# Patient Record
Sex: FEMALE | Race: WHITE | NOT HISPANIC OR LATINO | Employment: OTHER | URBAN - NONMETROPOLITAN AREA
[De-identification: names, ages, dates, MRNs, and addresses within clinical notes are randomized per-mention and may not be internally consistent; named-entity substitution may affect disease eponyms.]

---

## 2017-02-10 ENCOUNTER — OFFICE VISIT (OUTPATIENT)
Dept: CARDIOLOGY | Facility: CLINIC | Age: 67
End: 2017-02-10

## 2017-02-10 VITALS
HEIGHT: 66 IN | SYSTOLIC BLOOD PRESSURE: 148 MMHG | OXYGEN SATURATION: 98 % | WEIGHT: 227 LBS | DIASTOLIC BLOOD PRESSURE: 74 MMHG | HEART RATE: 78 BPM | BODY MASS INDEX: 36.48 KG/M2

## 2017-02-10 DIAGNOSIS — I10 ESSENTIAL HYPERTENSION: ICD-10-CM

## 2017-02-10 DIAGNOSIS — E66.09 OBESITY DUE TO EXCESS CALORIES, UNSPECIFIED OBESITY SEVERITY: ICD-10-CM

## 2017-02-10 DIAGNOSIS — I34.0 NON-RHEUMATIC MITRAL REGURGITATION: Chronic | ICD-10-CM

## 2017-02-10 DIAGNOSIS — E78.5 HYPERLIPIDEMIA, UNSPECIFIED HYPERLIPIDEMIA TYPE: ICD-10-CM

## 2017-02-10 DIAGNOSIS — R06.02 SOB (SHORTNESS OF BREATH): ICD-10-CM

## 2017-02-10 DIAGNOSIS — G47.30 SLEEP APNEA, UNSPECIFIED TYPE: ICD-10-CM

## 2017-02-10 DIAGNOSIS — I51.89 DIASTOLIC DYSFUNCTION: ICD-10-CM

## 2017-02-10 DIAGNOSIS — R07.9 CHEST PAIN ON EXERTION: Primary | ICD-10-CM

## 2017-02-10 PROCEDURE — 99204 OFFICE O/P NEW MOD 45 MIN: CPT | Performed by: NURSE PRACTITIONER

## 2017-02-10 PROCEDURE — 93000 ELECTROCARDIOGRAM COMPLETE: CPT | Performed by: NURSE PRACTITIONER

## 2017-02-10 RX ORDER — CALCIUM CARBONATE 750 MG/1
750 TABLET, CHEWABLE ORAL DAILY
COMMUNITY
End: 2017-02-10

## 2017-02-10 RX ORDER — DIAPER,BRIEF,INFANT-TODD,DISP
EACH MISCELLANEOUS
Refills: 0 | COMMUNITY
Start: 2017-02-06

## 2017-02-10 RX ORDER — VALSARTAN AND HYDROCHLOROTHIAZIDE 80; 12.5 MG/1; MG/1
1 TABLET, FILM COATED ORAL DAILY
Refills: 3 | COMMUNITY
Start: 2017-02-06

## 2017-02-10 NOTE — PROGRESS NOTES
Subjective:     Encounter Date:02/10/2017    Chief Complaint:    Patient ID: Ledy Blankenship is a 66 y.o. female here today for cardiac evaluation. She is referred by JOSE Oates.     HPI     Heart Murmur    Additional comments: unknown, had an echocardiogram in May 2016           Chest Pain    Additional comments: had stress tests at age 50, 60, 65 (May 2016)       Last edited by JOSE Velasquez on 2/10/2017 10:16 AM. (History)       Heart Murmur   This is a new problem. The current episode started 1 to 4 weeks ago. The problem occurs daily. The problem has been unchanged. Associated symptoms include chest pain (tightness) and congestion (improving). Pertinent negatives include no abdominal pain, chills, fever, headaches, nausea, neck pain, numbness, rash or vomiting. Cough: still coughing, now clear. Nothing aggravates the symptoms. She has tried nothing for the symptoms.   Chest Pain    This is a new problem. The current episode started more than 1 year ago. The onset quality is sudden. The problem occurs intermittently. The problem has been unchanged. The pain is present in the substernal region. The pain is at a severity of 7/10. The pain is moderate. The quality of the pain is described as dull and heavy. The pain does not radiate. Associated symptoms include irregular heartbeat, palpitations and shortness of breath. Pertinent negatives include no abdominal pain, back pain, dizziness, fever, headaches, nausea, numbness, syncope or vomiting. Cough: still coughing, now clear. The pain is aggravated by exertion. She has tried rest for the symptoms. The treatment provided significant (less than a minute) relief. Risk factors include lack of exercise and post-menopausal.   Her past medical history is significant for hyperlipidemia, hypertension, sleep apnea and valve disorder.   Her family medical history is significant for CAD and hypertension. Prior diagnostic workup includes echocardiogram and  stress echo.     History:   Past Medical History   Diagnosis Date   • Diastolic dysfunction    • Heart murmur    • Hyperlipidemia      has taken Crestor in the past a few times per week   • Hypertension    • Obesity    • Sleep apnea      unable to tolerate CPAP, better after tonsils removed   • SOB (shortness of breath)    • Valvular disease      mild MR     Past Surgical History   Procedure Laterality Date   • Tonsillectomy     • Hysterectomy     • Joint replacement     • Rotator cuff repair     • Cardiac catheterization       Social History     Social History   • Marital status: Unknown     Spouse name: N/A   • Number of children: N/A   • Years of education: N/A     Occupational History   • Not on file.     Social History Main Topics   • Smoking status: Former Smoker     Packs/day: 2.00     Years: 14.00     Types: Cigarettes     Start date: 1966     Quit date: 11/1980   • Smokeless tobacco: Never Used   • Alcohol use Yes      Comment: rarely, one drink once a month or less, drank a little more when she was younger   • Drug use: No   • Sexual activity: Not on file     Other Topics Concern   • Not on file     Social History Narrative     Family History   Problem Relation Age of Onset   • Heart failure Mother    • Valvular heart disease Mother    • Rheumatic fever Mother    • Aneurysm Father    • Heart failure Sister    • Heart disease Sister    • Valvular heart disease Sister    • Heart attack Sister    • Heart disease Brother    • Diabetes Brother    • Kidney failure Brother    • Heart disease Sister    • Valvular heart disease Sister    • Hypertension Sister        Outpatient Prescriptions Marked as Taking for the 2/10/17 encounter (Office Visit) with JOSE Velasquez   Medication Sig Dispense Refill   • [facility] CHEST CONGESTION RELIEF 400 MG tablet TAKE 1 TABLET BY MOUTH 3 TIMES A DAY PRN  0   • fluticasone (FLONASE) 50 MCG/ACT nasal spray 1 spray into each nostril Daily.     • NON FORMULARY Take 700 mg by  "mouth Daily.     • valsartan-hydrochlorothiazide (DIOVAN-HCT) 80-12.5 MG per tablet Take 1 tablet by mouth Daily.  3   • vitamin B-12 (CYANOCOBALAMIN) 1000 MCG tablet Take 1,000 mcg by mouth Daily.     • [DISCONTINUED] calcium carbonate EX (TUMS EX) 750 MG chewable tablet Chew 750 mg Daily.         Review of Systems:  Review of Systems   Constitution: Negative for chills, decreased appetite and fever.   HENT: Positive for congestion (improving). Negative for headaches.    Eyes: Negative for blurred vision and double vision.   Cardiovascular: Positive for chest pain (tightness), irregular heartbeat and palpitations. Negative for leg swelling and syncope.   Respiratory: Positive for shortness of breath. Negative for wheezing. Cough: still coughing, now clear.    Endocrine: Positive for cold intolerance. Negative for heat intolerance.   Hematologic/Lymphatic: Does not bruise/bleed easily.   Skin: Positive for dry skin. Negative for rash.   Musculoskeletal: Positive for muscle cramps (sometimes ). Negative for back pain, joint pain, neck pain and stiffness.   Gastrointestinal: Negative for abdominal pain, constipation, diarrhea, heartburn, melena, nausea and vomiting.   Genitourinary: Positive for nocturia (2 times a night ). Negative for hematuria.   Neurological: Negative for dizziness, light-headedness, loss of balance and numbness.   Psychiatric/Behavioral: Negative for depression. The patient does not have insomnia and is not nervous/anxious.             Objective:     Visit Vitals   • /74 (BP Location: Right arm, Patient Position: Sitting, Cuff Size: Adult)   • Pulse 78   • Ht 66\" (167.6 cm)   • Wt 227 lb (103 kg)   • SpO2 98%   • BMI 36.64 kg/m2         Physical Exam   Constitutional: She is oriented to person, place, and time. She appears well-developed and well-nourished.   HENT:   Head: Normocephalic and atraumatic.   Right Ear: External ear normal.   Left Ear: External ear normal.   Nose: Nose normal. "   Mouth/Throat: Oropharynx is clear and moist.   Eyes: Conjunctivae and EOM are normal. Pupils are equal, round, and reactive to light. Right eye exhibits no discharge. Left eye exhibits no discharge. No scleral icterus.   Neck: Normal range of motion. Neck supple. Normal carotid pulses, no hepatojugular reflux and no JVD present. Carotid bruit is not present. No tracheal deviation present. No thyromegaly present.   Thick     Cardiovascular: Normal rate and regular rhythm.   Occasional extrasystoles are present. Exam reveals no gallop and no friction rub.    No murmur heard.  Pulmonary/Chest: Effort normal and breath sounds normal. She has no wheezes. She has no rales.   Abdominal: Soft. Bowel sounds are normal. She exhibits no mass. There is no tenderness. There is no rebound and no guarding.   obese   Musculoskeletal: She exhibits no edema, tenderness or deformity.   Lymphadenopathy:     She has no cervical adenopathy.   Neurological: She is alert and oriented to person, place, and time. No cranial nerve deficit.   Skin: Skin is warm and dry.   Psychiatric: She has a normal mood and affect. Her behavior is normal. Judgment and thought content normal.   Vitals reviewed.      Lab/Diagnostics Review:   06/09/2016   CBC WBC 9.4, hemoglobin 14, hematocrit 41.3%, platelets 243,000  BMP sodium 139, potassium 3.5, chloride 10 CO2 27.3,UN 16, creatinine 0.82, calcium 9.6, glucose 109    05/12/2016 stress echocardiogram low risk of ischemia EF 50%, decreased functional    05/12/2016 2-D echocardiogram EF 65%, no regional wall motion abnormalities, grade 2 diastolic dysfunction,mild mitral regurgitation, moderate left atrial enlargement, mild right ventricular enlargement with normal systolic function, mild tricuspid regurgitation, pulmonary systolic pressure within normal range estimated at 24 mmHg.      ECG 12 Lead  Date/Time: 2/10/2017 6:02 PM  Performed by: TAWANDA MORA  Authorized by: TAWANDA MORA    Previous ECG: no previous ECG available  Rhythm: sinus rhythm  Rate: normal  BPM: 75  T Waves: T waves normal  QRS axis: normal  Other: no other findings  Clinical impression: normal ECG                  Assessment/Plan:         Ledy was seen today for heart murmur and chest pain.    Diagnoses and all orders for this visit:    Chest pain on exertion  Comments:  will check stress sestimibe  Orders:  -     Stress Test With Myocardial Perfusion One Day  -     Adult Transthoracic Echo Complete  -     CBC Auto Differential  -     Vitamin D 1,25 Dihydroxy    SOB (shortness of breath)  Comments:  possibly multifactorial - check stress sestamibe and echo, if normal will encourage routine aerobic exercise  Orders:  -     Adult Transthoracic Echo Complete    Diastolic dysfunction  Comments:  grade 2 per 5/2016 echo, will recheck     Essential hypertension  Comments:  might consider adding low dose beta-blocker or amlodipine - might help chest pain if no major areas of ischemia on stress    Non-rheumatic mitral regurgitation  Comments:  mild per 2016 echo  Orders:  -     Adult Transthoracic Echo Complete    Hyperlipidemia, unspecified hyperlipidemia type  Comments:  reportedly borderline, not checked in 2016, will check, restart statin if uncontrolled  Orders:  -     Comprehensive Metabolic Panel  -     Lipid Panel  -     TSH    Sleep apnea, unspecified type  Comments:  unable to tolerate CPAP, better after tonsillectomy    Obesity due to excess calories, unspecified obesity severity  Comments:  encourage healthy diet and exercise for weight loss after cardiac evaluation    Other orders  -     ECG 12 Lead        Return in about 6 months (around 8/10/2017) for Recheck.           Jade Garber APRN, ACNP-BC, CHFN-BC

## 2017-03-01 ENCOUNTER — HOSPITAL ENCOUNTER (OUTPATIENT)
Dept: CARDIOLOGY | Facility: HOSPITAL | Age: 67
Discharge: HOME | End: 2017-03-01

## 2017-03-01 ENCOUNTER — HOSPITAL ENCOUNTER (OUTPATIENT)
Dept: CARDIOLOGY | Facility: HOSPITAL | Age: 67
Discharge: HOME | End: 2017-03-01
Admitting: NURSE PRACTITIONER

## 2017-03-01 VITALS — HEART RATE: 67 BPM | SYSTOLIC BLOOD PRESSURE: 123 MMHG | DIASTOLIC BLOOD PRESSURE: 60 MMHG

## 2017-03-01 VITALS
HEIGHT: 66 IN | BODY MASS INDEX: 36.48 KG/M2 | SYSTOLIC BLOOD PRESSURE: 147 MMHG | WEIGHT: 227 LBS | DIASTOLIC BLOOD PRESSURE: 72 MMHG

## 2017-03-01 DIAGNOSIS — E78.5 HYPERLIPIDEMIA, UNSPECIFIED HYPERLIPIDEMIA TYPE: ICD-10-CM

## 2017-03-01 LAB
25(OH)D3 SERPL-MCNC: 27.2 NG/ML (ref 30–100)
ALBUMIN SERPL-MCNC: 3.9 G/DL (ref 3.5–5)
ALBUMIN/GLOB SERPL: 1.1 G/DL (ref 1.1–2.5)
ALP SERPL-CCNC: 62 U/L (ref 24–120)
ALT SERPL W P-5'-P-CCNC: 60 U/L (ref 0–54)
ANION GAP SERPL CALCULATED.3IONS-SCNC: 8 MMOL/L (ref 4–13)
ARTICHOKE IGE QN: 131 MG/DL (ref 0–99)
AST SERPL-CCNC: 52 U/L (ref 7–45)
BH CV ECHO MEAS - AO MAX PG (FULL): 0.64 MMHG
BH CV ECHO MEAS - AO MAX PG: 10.5 MMHG
BH CV ECHO MEAS - AO MEAN PG (FULL): 0 MMHG
BH CV ECHO MEAS - AO MEAN PG: 6 MMHG
BH CV ECHO MEAS - AO ROOT AREA (BSA CORRECTED): 1.2
BH CV ECHO MEAS - AO ROOT AREA: 5.3 CM^2
BH CV ECHO MEAS - AO ROOT DIAM: 2.6 CM
BH CV ECHO MEAS - AO V2 MAX: 162 CM/SEC
BH CV ECHO MEAS - AO V2 MEAN: 113 CM/SEC
BH CV ECHO MEAS - AO V2 VTI: 40.7 CM
BH CV ECHO MEAS - AVA(I,A): 3 CM^2
BH CV ECHO MEAS - AVA(I,D): 3 CM^2
BH CV ECHO MEAS - AVA(V,A): 3 CM^2
BH CV ECHO MEAS - AVA(V,D): 3 CM^2
BH CV ECHO MEAS - BSA(HAYCOCK): 2.2 M^2
BH CV ECHO MEAS - BSA: 2.1 M^2
BH CV ECHO MEAS - BZI_BMI: 36.2 KILOGRAMS/M^2
BH CV ECHO MEAS - BZI_METRIC_HEIGHT: 167.6 CM
BH CV ECHO MEAS - BZI_METRIC_WEIGHT: 101.6 KG
BH CV ECHO MEAS - EDV(CUBED): 85.2 ML
BH CV ECHO MEAS - EDV(TEICH): 87.7 ML
BH CV ECHO MEAS - EF(CUBED): 74.2 %
BH CV ECHO MEAS - EF(TEICH): 66.3 %
BH CV ECHO MEAS - ESV(CUBED): 22 ML
BH CV ECHO MEAS - ESV(TEICH): 29.6 ML
BH CV ECHO MEAS - FS: 36.4 %
BH CV ECHO MEAS - IVS/LVPW: 1.4
BH CV ECHO MEAS - IVSD: 1.5 CM
BH CV ECHO MEAS - LA DIMENSION: 3.9 CM
BH CV ECHO MEAS - LA/AO: 1.5
BH CV ECHO MEAS - LAT PEAK E' VEL: 8.4 CM/SEC
BH CV ECHO MEAS - LV MASS(C)D: 215.1 GRAMS
BH CV ECHO MEAS - LV MASS(C)DI: 102.5 GRAMS/M^2
BH CV ECHO MEAS - LV MAX PG: 9.9 MMHG
BH CV ECHO MEAS - LV MEAN PG: 6 MMHG
BH CV ECHO MEAS - LV V1 MAX: 157 CM/SEC
BH CV ECHO MEAS - LV V1 MEAN: 110 CM/SEC
BH CV ECHO MEAS - LV V1 VTI: 39.2 CM
BH CV ECHO MEAS - LVIDD: 4.4 CM
BH CV ECHO MEAS - LVIDS: 2.8 CM
BH CV ECHO MEAS - LVOT AREA (M): 3.1 CM^2
BH CV ECHO MEAS - LVOT AREA: 3.1 CM^2
BH CV ECHO MEAS - LVOT DIAM: 2 CM
BH CV ECHO MEAS - LVPWD: 1.1 CM
BH CV ECHO MEAS - MED PEAK E' VEL: 5.98 CM/SEC
BH CV ECHO MEAS - MV A MAX VEL: 125 CM/SEC
BH CV ECHO MEAS - MV DEC TIME: 0.27 SEC
BH CV ECHO MEAS - MV E MAX VEL: 130 CM/SEC
BH CV ECHO MEAS - MV E/A: 1
BH CV ECHO MEAS - RAP SYSTOLE: 5 MMHG
BH CV ECHO MEAS - RVSP: 17.1 MMHG
BH CV ECHO MEAS - SI(AO): 103 ML/M^2
BH CV ECHO MEAS - SI(CUBED): 30.1 ML/M^2
BH CV ECHO MEAS - SI(LVOT): 58.7 ML/M^2
BH CV ECHO MEAS - SI(TEICH): 27.7 ML/M^2
BH CV ECHO MEAS - SV(AO): 216.1 ML
BH CV ECHO MEAS - SV(CUBED): 63.2 ML
BH CV ECHO MEAS - SV(LVOT): 123.2 ML
BH CV ECHO MEAS - SV(TEICH): 58.1 ML
BH CV ECHO MEAS - TR MAX VEL: 174 CM/SEC
BILIRUB SERPL-MCNC: 0.7 MG/DL (ref 0.1–1)
BUN BLD-MCNC: 17 MG/DL (ref 5–21)
BUN/CREAT SERPL: 23.9 (ref 7–25)
CALCIUM SPEC-SCNC: 9.6 MG/DL (ref 8.4–10.4)
CHLORIDE SERPL-SCNC: 103 MMOL/L (ref 98–110)
CHOLEST SERPL-MCNC: 205 MG/DL (ref 130–200)
CO2 SERPL-SCNC: 29 MMOL/L (ref 24–31)
CREAT BLD-MCNC: 0.71 MG/DL (ref 0.5–1.4)
DEPRECATED RDW RBC AUTO: 42.8 FL (ref 40–54)
E/E' RATIO: 21.7
ERYTHROCYTE [DISTWIDTH] IN BLOOD BY AUTOMATED COUNT: 13.2 % (ref 12–15)
GFR SERPL CREATININE-BSD FRML MDRD: 82 ML/MIN/1.73
GLOBULIN UR ELPH-MCNC: 3.4 GM/DL
GLUCOSE BLD-MCNC: 98 MG/DL (ref 70–100)
HCT VFR BLD AUTO: 41 % (ref 37–47)
HDLC SERPL-MCNC: 48 MG/DL
HGB BLD-MCNC: 14 G/DL (ref 12–16)
LDLC/HDLC SERPL: 2.87 {RATIO}
LEFT ATRIUM VOLUME INDEX: 25.9 ML/M2
LEFT ATRIUM VOLUME: 54.4 CM3
MCH RBC QN AUTO: 30.5 PG (ref 28–32)
MCHC RBC AUTO-ENTMCNC: 34.1 G/DL (ref 33–36)
MCV RBC AUTO: 89.3 FL (ref 82–98)
PLATELET # BLD AUTO: 246 10*3/MM3 (ref 130–400)
PMV BLD AUTO: 10.2 FL (ref 6–12)
POTASSIUM BLD-SCNC: 4 MMOL/L (ref 3.5–5.3)
PROT SERPL-MCNC: 7.3 G/DL (ref 6.3–8.7)
RBC # BLD AUTO: 4.59 10*6/MM3 (ref 4.2–5.4)
SODIUM BLD-SCNC: 140 MMOL/L (ref 135–145)
TRIGL SERPL-MCNC: 96 MG/DL (ref 0–149)
TSH SERPL DL<=0.05 MIU/L-ACNC: 2.69 MIU/ML (ref 0.47–4.68)
WBC NRBC COR # BLD: 8.79 10*3/MM3 (ref 4.8–10.8)

## 2017-03-01 PROCEDURE — 85027 COMPLETE CBC AUTOMATED: CPT | Performed by: NURSE PRACTITIONER

## 2017-03-01 PROCEDURE — 84443 ASSAY THYROID STIM HORMONE: CPT | Performed by: NURSE PRACTITIONER

## 2017-03-01 PROCEDURE — 78452 HT MUSCLE IMAGE SPECT MULT: CPT

## 2017-03-01 PROCEDURE — 93017 CV STRESS TEST TRACING ONLY: CPT

## 2017-03-01 PROCEDURE — 78452 HT MUSCLE IMAGE SPECT MULT: CPT | Performed by: INTERNAL MEDICINE

## 2017-03-01 PROCEDURE — 80061 LIPID PANEL: CPT | Performed by: NURSE PRACTITIONER

## 2017-03-01 PROCEDURE — 80053 COMPREHEN METABOLIC PANEL: CPT | Performed by: NURSE PRACTITIONER

## 2017-03-01 PROCEDURE — 93306 TTE W/DOPPLER COMPLETE: CPT | Performed by: INTERNAL MEDICINE

## 2017-03-01 PROCEDURE — 36415 COLL VENOUS BLD VENIPUNCTURE: CPT

## 2017-03-01 PROCEDURE — 82306 VITAMIN D 25 HYDROXY: CPT | Performed by: NURSE PRACTITIONER

## 2017-03-01 PROCEDURE — A9500 TC99M SESTAMIBI: HCPCS | Performed by: NURSE PRACTITIONER

## 2017-03-01 PROCEDURE — 93306 TTE W/DOPPLER COMPLETE: CPT

## 2017-03-01 PROCEDURE — 0 TECHNETIUM SESTAMIBI: Performed by: NURSE PRACTITIONER

## 2017-03-01 PROCEDURE — 93018 CV STRESS TEST I&R ONLY: CPT | Performed by: INTERNAL MEDICINE

## 2017-03-01 RX ADMIN — Medication 1 DOSE: at 08:29

## 2017-03-01 RX ADMIN — Medication 1 DOSE: at 10:15

## 2017-03-01 NOTE — PROGRESS NOTES
CALLED PT INFORMED HER OF RESULTS,  ALSO SENT COPIES OF RESULTS TO JACEY GROVER NP,  PER TAWANDA MORA.

## 2017-03-01 NOTE — PROGRESS NOTES
TSH is normal.   Vit D is low (can cause noncardiac chest pain).   Cholesterol is borderline high - will need to restart statin if nuclear stress test abnormal. Otherwise continue healthy diet and exercise.   Kidney function is normal.   Liver enzymes are very slightly elevated - not concerning at this time - has she ever been screened for Hepatitis? Recommended for everyone born between 1945 and 1965.   CBC is normal - not anemic.     Send copies of all labs done today to Yaz De Los Santos NP and ask her to address low Vit D.

## 2017-03-03 LAB
BH CV STRESS BP STAGE 2: NORMAL
BH CV STRESS DURATION MIN STAGE 1: 3
BH CV STRESS DURATION MIN STAGE 2: 3
BH CV STRESS DURATION SEC STAGE 1: 0
BH CV STRESS DURATION SEC STAGE 2: 0
BH CV STRESS GRADE STAGE 1: 10
BH CV STRESS GRADE STAGE 2: 12
BH CV STRESS HR STAGE 1: 128
BH CV STRESS HR STAGE 2: 140
BH CV STRESS METS STAGE 1: 5
BH CV STRESS METS STAGE 2: 7.5
BH CV STRESS PROTOCOL 1: NORMAL
BH CV STRESS RECOVERY BP: NORMAL MMHG
BH CV STRESS RECOVERY HR: 96 BPM
BH CV STRESS SPEED STAGE 1: 1.7
BH CV STRESS SPEED STAGE 2: 2.5
BH CV STRESS STAGE 1: 1
BH CV STRESS STAGE 2: 2
LV EF NUC BP: 84 %
MAXIMAL PREDICTED HEART RATE: 154 BPM
PERCENT MAX PREDICTED HR: 90.91 %
STRESS BASELINE BP: NORMAL MMHG
STRESS BASELINE HR: 67 BPM
STRESS PERCENT HR: 107 %
STRESS POST EXERCISE DUR MIN: 6 MIN
STRESS POST EXERCISE DUR SEC: 0 SEC
STRESS POST PEAK BP: NORMAL MMHG
STRESS POST PEAK HR: 140 BPM
STRESS TARGET HR: 131 BPM

## 2024-10-15 ENCOUNTER — TELEPHONE (OUTPATIENT)
Dept: NEUROSURGERY | Age: 74
End: 2024-10-15

## 2024-10-15 NOTE — TELEPHONE ENCOUNTER
[facility] New Patient Questionnaire    Diagnosis/Reason for Referral?    DX: M51.26 (ICD-10-CM) - Other intervertebral disc displacement, lumbar region     2. Who is completing questionnaire?      Patient  X Caregiver Family      3. Has the patient had any previous spinal/brain surgeries?     NO      A. If yes, what is the name of the facility in which the surgery was performed?       B. Procedure/Surgery performed?       C. Who was the surgeon?       D. When was the surgery?    MM/YY       E. Did the patient improve after the surgery?        4. Is this a second opinion?   If yes, Dr. Sierra would like to review patient first before making the appointment.      5. Have MRI Images been obtain within the last year?     Yes X  No      XR  CT     If yes, where was the imaging performed?     [facility]   If yes, what part of the body?     Lumbar X Cervical  Thoracic  Brain     If yes, when was it obtained?      10/10/2024    Note: if the scan was performed at a facility other than [facility], the disc will need to be brought to the appointment or we need to reach out to obtain the disc.     A. Was the patient instructed to provide the disc?      Yes X   No      8. Has the patient had a NCV/EMG within the last year?      Yes  No X     If yes, where was it performed and date?      MM/YY  Location:      9. Has the patient been to Physical Therapy?      Yes X  No     If yes, what location, how long attended, and last visit?    Location: [facility]       Therapy Lasted: \"OVER A MONTH   Date of Last Visit:      10. Has the patient been to Pain Management?     Yes  No X     If yes, what location and last visit     Location:   Last Visit:   Is it helping?

## 2024-11-07 ENCOUNTER — CLINICAL DOCUMENTATION (OUTPATIENT)
Dept: NEUROSURGERY | Age: 74
End: 2024-11-07

## 2024-11-07 NOTE — PROGRESS NOTES
Patient dropped off disc of imaging and reports, imaging uploaded into pacs and reports scanned into media.     MRI Lumbar  XR Lumbar  XR Pelvis

## 2024-11-26 ENCOUNTER — OFFICE VISIT (OUTPATIENT)
Dept: NEUROSURGERY | Age: 74
End: 2024-11-26
Payer: MEDICARE

## 2024-11-26 VITALS
BODY MASS INDEX: 34.39 KG/M2 | SYSTOLIC BLOOD PRESSURE: 128 MMHG | HEIGHT: 66 IN | OXYGEN SATURATION: 97 % | DIASTOLIC BLOOD PRESSURE: 82 MMHG | WEIGHT: 214 LBS | RESPIRATION RATE: 18 BRPM | HEART RATE: 79 BPM

## 2024-11-26 DIAGNOSIS — M48.061 SPINAL STENOSIS OF LUMBAR REGION WITHOUT NEUROGENIC CLAUDICATION: ICD-10-CM

## 2024-11-26 DIAGNOSIS — M43.16 SPONDYLOLISTHESIS AT L4-L5 LEVEL: ICD-10-CM

## 2024-11-26 DIAGNOSIS — R26.89 POOR BALANCE: ICD-10-CM

## 2024-11-26 DIAGNOSIS — M51.369 DEGENERATION OF INTERVERTEBRAL DISC OF LUMBAR REGION, UNSPECIFIED WHETHER PAIN PRESENT: ICD-10-CM

## 2024-11-26 DIAGNOSIS — R20.2 NUMBNESS AND TINGLING OF BOTH FEET: Primary | ICD-10-CM

## 2024-11-26 DIAGNOSIS — R20.0 NUMBNESS AND TINGLING OF BOTH FEET: Primary | ICD-10-CM

## 2024-11-26 PROCEDURE — G8417 CALC BMI ABV UP PARAM F/U: HCPCS | Performed by: NEUROLOGICAL SURGERY

## 2024-11-26 PROCEDURE — G8427 DOCREV CUR MEDS BY ELIG CLIN: HCPCS | Performed by: NEUROLOGICAL SURGERY

## 2024-11-26 PROCEDURE — 4004F PT TOBACCO SCREEN RCVD TLK: CPT | Performed by: NEUROLOGICAL SURGERY

## 2024-11-26 PROCEDURE — 1123F ACP DISCUSS/DSCN MKR DOCD: CPT | Performed by: NEUROLOGICAL SURGERY

## 2024-11-26 PROCEDURE — G8484 FLU IMMUNIZE NO ADMIN: HCPCS | Performed by: NEUROLOGICAL SURGERY

## 2024-11-26 PROCEDURE — 99204 OFFICE O/P NEW MOD 45 MIN: CPT | Performed by: NEUROLOGICAL SURGERY

## 2024-11-26 PROCEDURE — 3017F COLORECTAL CA SCREEN DOC REV: CPT | Performed by: NEUROLOGICAL SURGERY

## 2024-11-26 PROCEDURE — 1090F PRES/ABSN URINE INCON ASSESS: CPT | Performed by: NEUROLOGICAL SURGERY

## 2024-11-26 PROCEDURE — G8400 PT W/DXA NO RESULTS DOC: HCPCS | Performed by: NEUROLOGICAL SURGERY

## 2024-11-26 PROCEDURE — 1159F MED LIST DOCD IN RCRD: CPT | Performed by: NEUROLOGICAL SURGERY

## 2024-11-26 RX ORDER — METOPROLOL SUCCINATE 25 MG/1
25 TABLET, EXTENDED RELEASE ORAL DAILY
COMMUNITY
Start: 2024-09-17

## 2024-11-26 RX ORDER — FLUTICASONE PROPIONATE 50 MCG
1 SPRAY, SUSPENSION (ML) NASAL DAILY
COMMUNITY

## 2024-11-26 RX ORDER — HYDROCODONE BITARTRATE AND ACETAMINOPHEN 5; 325 MG/1; MG/1
2 TABLET ORAL EVERY 6 HOURS PRN
COMMUNITY
Start: 2024-11-08

## 2024-11-26 RX ORDER — TEMAZEPAM 15 MG/1
15 CAPSULE ORAL NIGHTLY PRN
COMMUNITY
Start: 2024-08-30

## 2024-11-26 RX ORDER — VALSARTAN AND HYDROCHLOROTHIAZIDE 80; 12.5 MG/1; MG/1
1 TABLET, FILM COATED ORAL DAILY
COMMUNITY
Start: 2024-11-02

## 2024-11-26 ASSESSMENT — ENCOUNTER SYMPTOMS
GASTROINTESTINAL NEGATIVE: 1
BACK PAIN: 1
EYES NEGATIVE: 1
RESPIRATORY NEGATIVE: 1

## 2024-11-26 NOTE — PROGRESS NOTES
[facility]  Office Visit      Chief Complaint   Patient presents with    New Patient     Establishing care     Results     Imaging in PACS    Back Pain     Patient states her pain has gradually came on over time. She states the pain does radiate into her BLE but worse on the right side. She is having trouble with walking far distances and sitting/standing for long periods of time. She states she does have trouble with her balance. She is currently taking Ibuprofen and Norco PRN to help manage the pain.     Numbness     Patient states she does have numbness/tingling in her RLE that is constant.        HISTORY OF PRESENT ILLNESS:    Mita Kessler is a 74 y.o. female wife of Abebe Kessler who presents with numbness of her back, legs, and feet that has been ongoing for a few months.  The pain does not radiate. Does not have much back pain or real pain anywhere other than the right hip.  She states she is very off balance.  She has trouble raising her legs up high enough to walk on even ground outside R>L.      Denies any diabetes, chemotherapy use, radiation therapy, heavy metals, working around heavy chemicals or radioactive agents.  Denies any edema of the BLE.      Denies every having a nerve conduction study.        Of note she does not use tobacco and does not take blood thinning medications               Past Medical History:   Diagnosis Date    HBP (high blood pressure)        Past Surgical History:   Procedure Laterality Date    HYSTERECTOMY, TOTAL ABDOMINAL (CERVIX REMOVED)      ROTATOR CUFF REPAIR Right     THUMB ARTHROSCOPY         Current Outpatient Medications   Medication Sig Dispense Refill    HYDROcodone-acetaminophen (NORCO) 5-325 MG per tablet Take 2 tablets by mouth every 6 hours as needed.      metoprolol succinate (TOPROL XL) 25 MG extended release tablet Take 1 tablet by mouth daily as directed      temazepam (RESTORIL) 15 MG capsule Take 1 capsule by mouth nightly as needed.

## 2025-01-16 ENCOUNTER — HOSPITAL ENCOUNTER (OUTPATIENT)
Dept: NEUROLOGY | Age: 75
Discharge: HOME | End: 2025-01-16
Attending: NEUROLOGICAL SURGERY
Payer: MEDICARE

## 2025-01-16 PROBLEM — R26.89 POOR BALANCE: Status: ACTIVE | Noted: 2025-01-16

## 2025-01-16 PROBLEM — R20.2 NUMBNESS AND TINGLING OF BOTH FEET: Status: ACTIVE | Noted: 2025-01-16

## 2025-01-16 PROBLEM — R20.0 NUMBNESS AND TINGLING OF BOTH FEET: Status: ACTIVE | Noted: 2025-01-16

## 2025-01-16 PROCEDURE — 95909 NRV CNDJ TST 5-6 STUDIES: CPT

## 2025-01-16 PROCEDURE — 95886 MUSC TEST DONE W/N TEST COMP: CPT

## 2025-01-21 ENCOUNTER — OFFICE VISIT (OUTPATIENT)
Dept: NEUROSURGERY | Age: 75
End: 2025-01-21
Payer: MEDICARE

## 2025-01-21 VITALS
BODY MASS INDEX: 34.39 KG/M2 | SYSTOLIC BLOOD PRESSURE: 173 MMHG | WEIGHT: 214 LBS | HEART RATE: 68 BPM | DIASTOLIC BLOOD PRESSURE: 87 MMHG | RESPIRATION RATE: 18 BRPM | HEIGHT: 66 IN

## 2025-01-21 DIAGNOSIS — R20.0 NUMBNESS AND TINGLING OF BOTH FEET: Primary | ICD-10-CM

## 2025-01-21 DIAGNOSIS — R26.89 POOR BALANCE: ICD-10-CM

## 2025-01-21 DIAGNOSIS — M51.369 DEGENERATION OF INTERVERTEBRAL DISC OF LUMBAR REGION, UNSPECIFIED WHETHER PAIN PRESENT: ICD-10-CM

## 2025-01-21 DIAGNOSIS — M43.16 SPONDYLOLISTHESIS AT L4-L5 LEVEL: ICD-10-CM

## 2025-01-21 DIAGNOSIS — M48.061 SPINAL STENOSIS OF LUMBAR REGION WITHOUT NEUROGENIC CLAUDICATION: ICD-10-CM

## 2025-01-21 DIAGNOSIS — R20.2 NUMBNESS AND TINGLING OF BOTH FEET: Primary | ICD-10-CM

## 2025-01-21 PROCEDURE — G8417 CALC BMI ABV UP PARAM F/U: HCPCS | Performed by: NEUROLOGICAL SURGERY

## 2025-01-21 PROCEDURE — 1090F PRES/ABSN URINE INCON ASSESS: CPT | Performed by: NEUROLOGICAL SURGERY

## 2025-01-21 PROCEDURE — 1123F ACP DISCUSS/DSCN MKR DOCD: CPT | Performed by: NEUROLOGICAL SURGERY

## 2025-01-21 PROCEDURE — 1159F MED LIST DOCD IN RCRD: CPT | Performed by: NEUROLOGICAL SURGERY

## 2025-01-21 PROCEDURE — 3017F COLORECTAL CA SCREEN DOC REV: CPT | Performed by: NEUROLOGICAL SURGERY

## 2025-01-21 PROCEDURE — G8400 PT W/DXA NO RESULTS DOC: HCPCS | Performed by: NEUROLOGICAL SURGERY

## 2025-01-21 PROCEDURE — 1036F TOBACCO NON-USER: CPT | Performed by: NEUROLOGICAL SURGERY

## 2025-01-21 PROCEDURE — 99214 OFFICE O/P EST MOD 30 MIN: CPT | Performed by: NEUROLOGICAL SURGERY

## 2025-01-21 PROCEDURE — G8427 DOCREV CUR MEDS BY ELIG CLIN: HCPCS | Performed by: NEUROLOGICAL SURGERY

## 2025-01-21 ASSESSMENT — ENCOUNTER SYMPTOMS
GASTROINTESTINAL NEGATIVE: 1
EYES NEGATIVE: 1
RESPIRATORY NEGATIVE: 1

## 2025-01-21 NOTE — PROGRESS NOTES
[facility]  Office Visit      Chief Complaint   Patient presents with    Follow-up     Patient presents for follow up after NCS.      Numbness     Patient states that she is still having numbness and tingling in her BLE and feet.     Results     NCS (1/16/2025)       HISTORY OF PRESENT ILLNESS:    Mita Kessler is a 74 y.o. female wife of Abebe Kessler who presents with numbness of her back, legs, and feet that has been ongoing for a few months.  The pain does not radiate. Does not have much back pain or real pain anywhere other than the right hip.  She states she is very off balance.  She has trouble raising her legs up high enough to walk on even ground outside R>L.      Denies any diabetes, chemotherapy use, radiation therapy, heavy metals, working around heavy chemicals or radioactive agents.  Denies any edema of the BLE.      Today she returns to review the NCS.  She states she does not have much back pain or leg pain, however, her main complaint is tingling and numbness of the distal legs.  She states she is off balance when ambulating. She continues to states \"I have no pain\".  Denies any numbness or symptoms in her arms or hands.      She has attempted the following:  Gabapentin - \"made me crazy\"    Of note she does not use tobacco and does not take blood thinning medications               Past Medical History:   Diagnosis Date    HBP (high blood pressure)        Past Surgical History:   Procedure Laterality Date    HYSTERECTOMY, TOTAL ABDOMINAL (CERVIX REMOVED)      ROTATOR CUFF REPAIR Right     THUMB ARTHROSCOPY         Current Outpatient Medications   Medication Sig Dispense Refill    HYDROcodone-acetaminophen (NORCO) 5-325 MG per tablet Take 2 tablets by mouth every 6 hours as needed.      metoprolol succinate (TOPROL XL) 25 MG extended release tablet Take 1 tablet by mouth daily as directed      temazepam (RESTORIL) 15 MG capsule Take 1 capsule by mouth nightly as needed.

## 2025-01-21 NOTE — PROGRESS NOTES
Review of Systems   Constitutional: Negative.    HENT: Negative.     Eyes: Negative.    Respiratory: Negative.     Cardiovascular: Negative.    Gastrointestinal: Negative.    Genitourinary: Negative.    Musculoskeletal:  Positive for myalgias.   Skin: Negative.    Neurological:  Positive for tingling.   Endo/Heme/Allergies: Negative.    Psychiatric/Behavioral: Negative.

## 2025-03-21 ENCOUNTER — OFFICE VISIT (OUTPATIENT)
Dept: NEUROLOGY | Age: 75
End: 2025-03-21
Payer: MEDICARE

## 2025-03-21 VITALS
BODY MASS INDEX: 34.39 KG/M2 | SYSTOLIC BLOOD PRESSURE: 178 MMHG | HEART RATE: 66 BPM | OXYGEN SATURATION: 96 % | WEIGHT: 214 LBS | HEIGHT: 66 IN | DIASTOLIC BLOOD PRESSURE: 98 MMHG

## 2025-03-21 DIAGNOSIS — R20.0 NUMBNESS AND TINGLING: ICD-10-CM

## 2025-03-21 DIAGNOSIS — R20.2 NUMBNESS AND TINGLING: Primary | ICD-10-CM

## 2025-03-21 DIAGNOSIS — R23.8 CHANGE OF SKIN COLOR: ICD-10-CM

## 2025-03-21 DIAGNOSIS — R20.2 PARESTHESIAS: ICD-10-CM

## 2025-03-21 DIAGNOSIS — R20.0 NUMBNESS AND TINGLING: Primary | ICD-10-CM

## 2025-03-21 DIAGNOSIS — R53.83 OTHER FATIGUE: ICD-10-CM

## 2025-03-21 DIAGNOSIS — R20.2 NUMBNESS AND TINGLING: ICD-10-CM

## 2025-03-21 DIAGNOSIS — R09.89 OTHER SPECIFIED SYMPTOMS AND SIGNS INVOLVING THE CIRCULATORY AND RESPIRATORY SYSTEMS: ICD-10-CM

## 2025-03-21 LAB
CRP SERPL-MCNC: <3 MG/L (ref 0–5)
ERYTHROCYTE [SEDIMENTATION RATE] IN BLOOD BY WESTERGREN METHOD: 22 MM/HR (ref 0–25)
FOLATE SERPL-MCNC: 5.9 NG/ML (ref 4.8–37.3)
HIV-1 P24 AG: NORMAL
HIV1+2 AB SERPLBLD QL IA.RAPID: NORMAL
RHEUMATOID FACT SER NEPH-ACNC: <10 IU/ML
VIT B12 SERPL-MCNC: 468 PG/ML (ref 232–1245)

## 2025-03-21 PROCEDURE — 99214 OFFICE O/P EST MOD 30 MIN: CPT | Performed by: NURSE PRACTITIONER

## 2025-03-21 PROCEDURE — 1090F PRES/ABSN URINE INCON ASSESS: CPT | Performed by: NURSE PRACTITIONER

## 2025-03-21 PROCEDURE — 3017F COLORECTAL CA SCREEN DOC REV: CPT | Performed by: NURSE PRACTITIONER

## 2025-03-21 PROCEDURE — 1159F MED LIST DOCD IN RCRD: CPT | Performed by: NURSE PRACTITIONER

## 2025-03-21 PROCEDURE — G8400 PT W/DXA NO RESULTS DOC: HCPCS | Performed by: NURSE PRACTITIONER

## 2025-03-21 PROCEDURE — 1123F ACP DISCUSS/DSCN MKR DOCD: CPT | Performed by: NURSE PRACTITIONER

## 2025-03-21 PROCEDURE — 1036F TOBACCO NON-USER: CPT | Performed by: NURSE PRACTITIONER

## 2025-03-21 PROCEDURE — G8417 CALC BMI ABV UP PARAM F/U: HCPCS | Performed by: NURSE PRACTITIONER

## 2025-03-21 PROCEDURE — G8427 DOCREV CUR MEDS BY ELIG CLIN: HCPCS | Performed by: NURSE PRACTITIONER

## 2025-03-21 NOTE — PROGRESS NOTES
[facility] Office Note      Patient:   Mita Kessler  MR#:    [id number]  Account Number:                         YOB: 1950  Date of Evaluation:  3/21/2025  Time of Note:                          9:51 AM  Primary/Referring Physician:  Geri Padilla APRN - CNP   Consulting Physician:  ROSALEE Kay    NEW PATIENT CONSULTATION      Chief Complaint   Patient presents with    New Patient    Numbness     C/O BLE numbness and tingling that started about ~1 year ago. Pt states feet feel heavy.      History of Present Illness  Mita Kessler is a 74 y.o. year old female here who presents for evaluation of numbness and tingling in her feet and legs that started gradually about 1 year ago. She experiences bilateral numbness and tingling in her feet, with the left side being more severe. Physical therapy has provided some relief. She also reports difficulty in climbing into her grandson's truck due to right leg weakness. She describes a sensation of something being pasted on the soles of her feet. She has attempted to manage her symptoms with creams. She is not diabetic and has no history of toxin or chemical exposure, cancer, or chemotherapy. She ceased alcohol consumption at age 40. She reports no family history of neuropathy or leg numbness. She has not experienced any falls since starting therapy and reports no upper extremity symptoms. Reports no leg swelling or significant color changes. She has not undergone any vascular scans or ultrasounds of her legs but has had a nerve conduction study that was normal. MRI lumbar spine with severe central canal stenosis, multilevel DDD and NFS.  She has been evaluated by neurosurgery who do not feel that her symptoms are clearly correlated with imaging    She attributes her elevated blood pressure today to driving in traffic and climbing stairs. She reports no chest pain, shortness of breath, vision changes, or headaches. She has no history of

## 2025-03-22 LAB
B BURGDOR IGG SER QL IB: NEGATIVE
B BURGDOR IGM SER QL IB: NEGATIVE
NUCLEAR IGG SER QL IA: NORMAL

## 2025-03-23 LAB
ARSENIC BLD-MCNC: <10 UG/L
CADMIUM BLD-MCNC: <1 UG/L
LEAD BLD-MCNC: <2 UG/DL
MERCURY BLD-MCNC: <2.5 UG/L

## 2025-03-24 ENCOUNTER — RESULTS FOLLOW-UP (OUTPATIENT)
Dept: NEUROLOGY | Age: 75
End: 2025-03-24

## 2025-03-24 LAB
ALBUMIN SERPL-MCNC: 4.01 G/DL (ref 3.75–5.01)
ALPHA1 GLOB SERPL ELPH-MCNC: 0.23 G/DL (ref 0.19–0.46)
ALPHA2 GLOB SERPL ELPH-MCNC: 0.96 G/DL (ref 0.48–1.05)
B-GLOBULIN SERPL ELPH-MCNC: 1.04 G/DL (ref 0.48–1.1)
GAMMA GLOB SERPL ELPH-MCNC: 1.06 G/DL (ref 0.62–1.51)
INTERPRETATION SERPL IFE-IMP: NORMAL
METHYLMALONATE SERPL-SCNC: 0.13 UMOL/L (ref 0–0.4)
PROT SERPL-MCNC: 7.3 G/DL (ref 6.3–8.2)
PROTEIN ELECTROPHORESIS, SERUM: NORMAL

## 2025-03-26 LAB — VIT B1 BLD-MCNC: 170 NMOL/L (ref 70–180)

## 2025-04-15 ENCOUNTER — HOSPITAL ENCOUNTER (OUTPATIENT)
Dept: NON INVASIVE DIAGNOSTICS | Age: 75
Discharge: HOME | End: 2025-04-17
Payer: MEDICARE

## 2025-04-15 ENCOUNTER — OFFICE VISIT (OUTPATIENT)
Dept: NEUROSURGERY | Age: 75
End: 2025-04-15
Payer: MEDICARE

## 2025-04-15 VITALS
SYSTOLIC BLOOD PRESSURE: 160 MMHG | BODY MASS INDEX: 33.75 KG/M2 | WEIGHT: 210 LBS | HEIGHT: 66 IN | HEART RATE: 67 BPM | DIASTOLIC BLOOD PRESSURE: 84 MMHG

## 2025-04-15 DIAGNOSIS — M51.369 DEGENERATION OF INTERVERTEBRAL DISC OF LUMBAR REGION, UNSPECIFIED WHETHER PAIN PRESENT: ICD-10-CM

## 2025-04-15 DIAGNOSIS — R23.8 CHANGE OF SKIN COLOR: ICD-10-CM

## 2025-04-15 DIAGNOSIS — R20.2 PARESTHESIAS: ICD-10-CM

## 2025-04-15 DIAGNOSIS — R20.2 NUMBNESS AND TINGLING OF BOTH FEET: Primary | ICD-10-CM

## 2025-04-15 DIAGNOSIS — R20.2 NUMBNESS AND TINGLING: ICD-10-CM

## 2025-04-15 DIAGNOSIS — R20.0 NUMBNESS AND TINGLING OF BOTH FEET: Primary | ICD-10-CM

## 2025-04-15 DIAGNOSIS — R20.0 NUMBNESS AND TINGLING: ICD-10-CM

## 2025-04-15 DIAGNOSIS — R26.89 POOR BALANCE: ICD-10-CM

## 2025-04-15 DIAGNOSIS — R09.89 OTHER SPECIFIED SYMPTOMS AND SIGNS INVOLVING THE CIRCULATORY AND RESPIRATORY SYSTEMS: ICD-10-CM

## 2025-04-15 DIAGNOSIS — M43.16 SPONDYLOLISTHESIS AT L4-L5 LEVEL: ICD-10-CM

## 2025-04-15 PROCEDURE — 3017F COLORECTAL CA SCREEN DOC REV: CPT | Performed by: NEUROLOGICAL SURGERY

## 2025-04-15 PROCEDURE — 1036F TOBACCO NON-USER: CPT | Performed by: NEUROLOGICAL SURGERY

## 2025-04-15 PROCEDURE — 99213 OFFICE O/P EST LOW 20 MIN: CPT | Performed by: NEUROLOGICAL SURGERY

## 2025-04-15 PROCEDURE — G8417 CALC BMI ABV UP PARAM F/U: HCPCS | Performed by: NEUROLOGICAL SURGERY

## 2025-04-15 PROCEDURE — 1090F PRES/ABSN URINE INCON ASSESS: CPT | Performed by: NEUROLOGICAL SURGERY

## 2025-04-15 PROCEDURE — G8427 DOCREV CUR MEDS BY ELIG CLIN: HCPCS | Performed by: NEUROLOGICAL SURGERY

## 2025-04-15 PROCEDURE — G8400 PT W/DXA NO RESULTS DOC: HCPCS | Performed by: NEUROLOGICAL SURGERY

## 2025-04-15 PROCEDURE — 93922 UPR/L XTREMITY ART 2 LEVELS: CPT

## 2025-04-15 PROCEDURE — 1123F ACP DISCUSS/DSCN MKR DOCD: CPT | Performed by: NEUROLOGICAL SURGERY

## 2025-04-15 ASSESSMENT — ENCOUNTER SYMPTOMS
RESPIRATORY NEGATIVE: 1
EYES NEGATIVE: 1
GASTROINTESTINAL NEGATIVE: 1

## 2025-04-15 NOTE — PROGRESS NOTES
[facility]  Office Visit      Chief Complaint   Patient presents with    Follow-up     Patient states that PT has improved sensation in her feet and legs. Patient states that she is not having any back pain. Patient states that she is walking much better and balance has improved.        HISTORY OF PRESENT ILLNESS:    Mita Kessler is a 75 y.o. female wife of Abebe Kessler who presents with numbness of her back, legs, and feet that has been ongoing for a few months.  The pain does not radiate. Does not have much back pain or real pain anywhere other than the right hip.  She states she is very off balance.  She has trouble raising her legs up high enough to walk on even ground outside R>L.      Denies any diabetes, chemotherapy use, radiation therapy, heavy metals, working around heavy chemicals or radioactive agents.  Denies any edema of the BLE.  NCS was normal.  She denied any back or leg pains.      Today she returns to clinic for routine follow up.  She states she has been doing PT and her numbness has started to improve some.  She continues to deny any back pain or radicular pain.  She states she is a little better.      She has attempted the following:  Gabapentin - \"made me crazy\"  Physical therapy - helping     Of note she does not use tobacco and does not take blood thinning medications               Past Medical History:   Diagnosis Date    HBP (high blood pressure)        Past Surgical History:   Procedure Laterality Date    HYSTERECTOMY, TOTAL ABDOMINAL (CERVIX REMOVED)      ROTATOR CUFF REPAIR Right     THUMB ARTHROSCOPY         Current Outpatient Medications   Medication Sig Dispense Refill    BABY ASPIRIN PO Take 81 mg by mouth      HYDROcodone-acetaminophen (NORCO) 5-325 MG per tablet Take 2 tablets by mouth every 6 hours as needed.      metoprolol succinate (TOPROL XL) 25 MG extended release tablet Take 1 tablet by mouth daily as directed      valsartan-hydroCHLOROthiazide (DIOVAN-HCT)

## 2025-04-19 LAB
ECHO BSA: 2.11 M2
VAS IMMEDIATE LEFT ABI: 1.15
VAS IMMEDIATE LEFT POST TIBIAL BP: 189 MMHG
VAS IMMEDIATE RIGHT ABI: 1.07
VAS IMMEDIATE RIGHT BRACHIAL BP: 164 MMHG
VAS IMMEDIATE RIGHT POST TIBIAL BP: 175 MMHG
VAS LEFT ABI: 1.22
VAS LEFT ARM BP: 149 MMHG
VAS LEFT DORSALIS PEDIS BP: 176 MMHG
VAS LEFT PTA BP: 184 MMHG
VAS LEFT TBI: 0.88
VAS LEFT TOE PRESSURE: 133 MMHG
VAS RIGHT ABI: 1.23
VAS RIGHT ARM BP: 151 MMHG
VAS RIGHT DORSALIS PEDIS BP: 175 MMHG
VAS RIGHT PTA BP: 185 MMHG
VAS RIGHT TBI: 0.89
VAS RIGHT TOE PRESSURE: 134 MMHG

## 2025-04-21 ENCOUNTER — RESULTS FOLLOW-UP (OUTPATIENT)
Dept: NEUROLOGY | Age: 75
End: 2025-04-21

## 2025-04-21 DIAGNOSIS — R68.89 ABNORMAL ANKLE BRACHIAL INDEX (ABI): Primary | ICD-10-CM

## 2025-04-22 ENCOUNTER — TELEPHONE (OUTPATIENT)
Dept: VASCULAR SURGERY | Age: 75
End: 2025-04-22

## 2025-04-22 NOTE — TELEPHONE ENCOUNTER
Called patient and went over test results.   - Message from ROSALEE Kay CNP sent at 4/21/2025  8:15 AM CDT -----  Please let her know that it looks like there is some vascular insufficiency on the right.  I am going to send her to vascular       Patient voiced understanding and stated vascular had called her earlier and scheduled and now she knows why. She thanked me for the call.

## 2025-04-22 NOTE — TELEPHONE ENCOUNTER
----- Message from ROSALEE Kay CNP sent at 4/21/2025  8:15 AM CDT -----  Please let her know that it looks like there is some vascular insufficiency on the right.  I am going to send her to vascular

## 2025-05-07 ENCOUNTER — OFFICE VISIT (OUTPATIENT)
Dept: VASCULAR SURGERY | Age: 75
End: 2025-05-07
Payer: MEDICARE

## 2025-05-07 VITALS
HEART RATE: 62 BPM | TEMPERATURE: 97.2 F | OXYGEN SATURATION: 99 % | SYSTOLIC BLOOD PRESSURE: 130 MMHG | DIASTOLIC BLOOD PRESSURE: 90 MMHG

## 2025-05-07 DIAGNOSIS — I70.213 ATHEROSCLER OF NATIVE ARTERY OF BOTH LEGS WITH INTERMIT CLAUDICATION: Primary | ICD-10-CM

## 2025-05-07 PROCEDURE — 1159F MED LIST DOCD IN RCRD: CPT | Performed by: NURSE PRACTITIONER

## 2025-05-07 PROCEDURE — 3017F COLORECTAL CA SCREEN DOC REV: CPT | Performed by: NURSE PRACTITIONER

## 2025-05-07 PROCEDURE — G8417 CALC BMI ABV UP PARAM F/U: HCPCS | Performed by: NURSE PRACTITIONER

## 2025-05-07 PROCEDURE — 1090F PRES/ABSN URINE INCON ASSESS: CPT | Performed by: NURSE PRACTITIONER

## 2025-05-07 PROCEDURE — G8427 DOCREV CUR MEDS BY ELIG CLIN: HCPCS | Performed by: NURSE PRACTITIONER

## 2025-05-07 PROCEDURE — 99203 OFFICE O/P NEW LOW 30 MIN: CPT | Performed by: NURSE PRACTITIONER

## 2025-05-07 PROCEDURE — 1123F ACP DISCUSS/DSCN MKR DOCD: CPT | Performed by: NURSE PRACTITIONER

## 2025-05-07 PROCEDURE — G8400 PT W/DXA NO RESULTS DOC: HCPCS | Performed by: NURSE PRACTITIONER

## 2025-05-07 PROCEDURE — 1036F TOBACCO NON-USER: CPT | Performed by: NURSE PRACTITIONER

## 2025-05-07 NOTE — PROGRESS NOTES
Mita Kessler (:  1950) is a 75 y.o. female,New patient, here for evaluation of the following chief complaint(s):  New Patient            SUBJECTIVE/OBJECTIVE:  Mita has a history of peripheral vascular disease of the lower extremities.  She has had this for < 1 year. Current treatment includes asa.  Mita has not had new wounds. Recently, she reports numbness and tingling in her feet.   She also has minimal claudication.  Mita reports that the right leg is equal to the left.  She reports claudication is not changed and is mostly in the form of generalized weakness starting in the calves. She has a short recovery time. This is reproducible in nature. She reports ischemic rest pain 0 times per night.  She reports walking with cart does not help.    I have personally reviewed the following: problem list, current meds, allergies, PMH, PSH, family hx, and social hx  Mita Kessler is a 75 y.o. female with the following history as recorded in [facility]:  Patient Active Problem List    Diagnosis Date Noted    Numbness and tingling of both feet 2025    Poor balance 2025     Current Outpatient Medications   Medication Sig Dispense Refill    BABY ASPIRIN PO Take 81 mg by mouth      metoprolol succinate (TOPROL XL) 25 MG extended release tablet Take 1 tablet by mouth daily as directed      valsartan-hydroCHLOROthiazide (DIOVAN-HCT) 80-12.5 MG per tablet Take 1 tablet by mouth daily as directed      IBUPROFEN PO Take by mouth       No current facility-administered medications for this visit.     Allergies: Rosuvastatin  Past Medical History:   Diagnosis Date    HBP (high blood pressure)      Past Surgical History:   Procedure Laterality Date    HYSTERECTOMY, TOTAL ABDOMINAL (CERVIX REMOVED)      ROTATOR CUFF REPAIR Right     THUMB ARTHROSCOPY       No family history on file.  Social History     Tobacco Use    Smoking status: Never    Smokeless tobacco: Never   Substance Use Topics    Alcohol use:

## 2025-08-06 ENCOUNTER — OFFICE VISIT (OUTPATIENT)
Dept: CARDIOLOGY CLINIC | Age: 75
End: 2025-08-06
Payer: MEDICARE

## 2025-08-06 VITALS
HEART RATE: 63 BPM | DIASTOLIC BLOOD PRESSURE: 82 MMHG | WEIGHT: 198 LBS | BODY MASS INDEX: 31.82 KG/M2 | HEIGHT: 66 IN | SYSTOLIC BLOOD PRESSURE: 132 MMHG | OXYGEN SATURATION: 97 %

## 2025-08-06 DIAGNOSIS — Z87.891 FORMER SMOKER: ICD-10-CM

## 2025-08-06 DIAGNOSIS — I10 ESSENTIAL HYPERTENSION: ICD-10-CM

## 2025-08-06 DIAGNOSIS — Z82.49 FAMILY HISTORY OF CORONARY ARTERY DISEASE: ICD-10-CM

## 2025-08-06 DIAGNOSIS — R06.02 SHORTNESS OF BREATH: Primary | ICD-10-CM

## 2025-08-06 DIAGNOSIS — R01.1 HEART MURMUR: ICD-10-CM

## 2025-08-06 PROCEDURE — 1036F TOBACCO NON-USER: CPT | Performed by: NURSE PRACTITIONER

## 2025-08-06 PROCEDURE — 1159F MED LIST DOCD IN RCRD: CPT | Performed by: NURSE PRACTITIONER

## 2025-08-06 PROCEDURE — G8417 CALC BMI ABV UP PARAM F/U: HCPCS | Performed by: NURSE PRACTITIONER

## 2025-08-06 PROCEDURE — 3079F DIAST BP 80-89 MM HG: CPT | Performed by: NURSE PRACTITIONER

## 2025-08-06 PROCEDURE — 93000 ELECTROCARDIOGRAM COMPLETE: CPT | Performed by: NURSE PRACTITIONER

## 2025-08-06 PROCEDURE — G8427 DOCREV CUR MEDS BY ELIG CLIN: HCPCS | Performed by: NURSE PRACTITIONER

## 2025-08-06 PROCEDURE — 1090F PRES/ABSN URINE INCON ASSESS: CPT | Performed by: NURSE PRACTITIONER

## 2025-08-06 PROCEDURE — 1123F ACP DISCUSS/DSCN MKR DOCD: CPT | Performed by: NURSE PRACTITIONER

## 2025-08-06 PROCEDURE — 3017F COLORECTAL CA SCREEN DOC REV: CPT | Performed by: NURSE PRACTITIONER

## 2025-08-06 PROCEDURE — 3075F SYST BP GE 130 - 139MM HG: CPT | Performed by: NURSE PRACTITIONER

## 2025-08-06 PROCEDURE — 99204 OFFICE O/P NEW MOD 45 MIN: CPT | Performed by: NURSE PRACTITIONER

## 2025-08-06 PROCEDURE — G8400 PT W/DXA NO RESULTS DOC: HCPCS | Performed by: NURSE PRACTITIONER

## 2025-08-06 RX ORDER — FOLIC ACID 0.4 MG
400 TABLET ORAL DAILY
COMMUNITY

## 2025-08-06 RX ORDER — METOPROLOL SUCCINATE 25 MG/1
25 TABLET, EXTENDED RELEASE ORAL NIGHTLY
Qty: 90 TABLET | Refills: 3 | Status: SHIPPED | OUTPATIENT
Start: 2025-08-06

## 2025-08-27 ENCOUNTER — HOSPITAL ENCOUNTER (OUTPATIENT)
Age: 75
Discharge: HOME | End: 2025-08-29
Payer: MEDICARE

## 2025-08-27 ENCOUNTER — HOSPITAL ENCOUNTER (OUTPATIENT)
Dept: CT IMAGING | Age: 75
Discharge: HOME | End: 2025-08-27
Payer: MEDICARE

## 2025-08-27 DIAGNOSIS — R01.1 HEART MURMUR: ICD-10-CM

## 2025-08-27 DIAGNOSIS — Z82.49 FAMILY HISTORY OF CORONARY ARTERY DISEASE: ICD-10-CM

## 2025-08-27 DIAGNOSIS — Z87.891 FORMER SMOKER: ICD-10-CM

## 2025-08-27 LAB
ECHO AO ASC DIAM: 3.4 CM
ECHO AO ROOT DIAM: 1.4 CM
ECHO AO SINUS VALSALVA DIAM: 2.4 CM
ECHO AO ST JNCT DIAM: 2.2 CM
ECHO AV AREA PEAK VELOCITY: 2.8 CM2
ECHO AV AREA VTI: 2.8 CM2
ECHO AV MEAN GRADIENT: 11 MMHG
ECHO AV MEAN VELOCITY: 1.6 M/S
ECHO AV PEAK GRADIENT: 16 MMHG
ECHO AV PEAK VELOCITY: 2 M/S
ECHO AV REGURGITANT FRACTION: -118 %
ECHO AV REGURGITANT VOLUME: -175 ML
ECHO AV VELOCITY RATIO: 0.9
ECHO AV VTI: 53.3 CM
ECHO EST RA PRESSURE: 3 MMHG
ECHO IVC PROX: 1.8 CM
ECHO LA AREA 2C: 20.3 CM2
ECHO LA AREA 4C: 17.6 CM2
ECHO LA DIAMETER: 3.7 CM
ECHO LA MAJOR AXIS: 5.3 CM
ECHO LA MINOR AXIS: 5.5 CM
ECHO LA TO AORTIC ROOT RATIO: 2.64
ECHO LA VOL BP: 53 ML (ref 22–52)
ECHO LA VOL MOD A2C: 59 ML (ref 22–52)
ECHO LA VOL MOD A4C: 45 ML (ref 22–52)
ECHO LV E' LATERAL VELOCITY: 7.83 CM/S
ECHO LV E' SEPTAL VELOCITY: 4.79 CM/S
ECHO LV EDV A2C: 67 ML
ECHO LV EDV A4C: 84 ML
ECHO LV EF PHYSICIAN: 60 %
ECHO LV EJECTION FRACTION A2C: 63 %
ECHO LV EJECTION FRACTION A4C: 63 %
ECHO LV EJECTION FRACTION BIPLANE: 66 % (ref 55–100)
ECHO LV ESV A2C: 25 ML
ECHO LV ESV A4C: 31 ML
ECHO LV FRACTIONAL SHORTENING: 30 % (ref 28–44)
ECHO LV INTERNAL DIMENSION DIASTOLIC: 5.3 CM (ref 3.9–5.3)
ECHO LV INTERNAL DIMENSION SYSTOLIC: 3.7 CM
ECHO LV IVSD: 1.3 CM (ref 0.6–0.9)
ECHO LV MASS 2D: 227.7 G (ref 67–162)
ECHO LV POSTERIOR WALL DIASTOLIC: 0.9 CM (ref 0.6–0.9)
ECHO LV RELATIVE WALL THICKNESS RATIO: 0.34
ECHO LVOT AREA: 3.1 CM2
ECHO LVOT AV VTI INDEX: 0.89
ECHO LVOT DIAM: 2 CM
ECHO LVOT MEAN GRADIENT: 7 MMHG
ECHO LVOT MEAN GRADIENT: 7 MMHG
ECHO LVOT PEAK GRADIENT: 13 MMHG
ECHO LVOT PEAK GRADIENT: 13 MMHG
ECHO LVOT PEAK VELOCITY: 1.8 M/S
ECHO LVOT SV: 148.2 ML
ECHO LVOT VTI: 47.2 CM
ECHO MV A VELOCITY: 1.21 M/S
ECHO MV ANNULUS DIAMETER: 1.5 CM
ECHO MV AREA VTI: 2.6 CM2
ECHO MV E VELOCITY: 1.08 M/S
ECHO MV E/A RATIO: 0.89
ECHO MV E/E' LATERAL: 13.79
ECHO MV E/E' RATIO (AVERAGED): 18.17
ECHO MV E/E' SEPTAL: 22.55
ECHO MV LVOT VTI INDEX: 1.2
ECHO MV MAX VELOCITY: 1.5 M/S
ECHO MV MEAN GRADIENT: 4 MMHG
ECHO MV MEAN VELOCITY: 0.9 M/S
ECHO MV PEAK GRADIENT: 9 MMHG
ECHO MV REGURGITANT FRACTION CONT EQ: 54 %
ECHO MV REGURGITANT VOLUME: 175.02 ML
ECHO MV REGURGITANT VTIA: 183 CM
ECHO MV VTI: 56.8 CM
ECHO RA AREA 4C: 10.9 CM2
ECHO RA VOLUME: 23 ML
ECHO RIGHT VENTRICULAR SYSTOLIC PRESSURE (RVSP): 14 MMHG
ECHO RV BASAL DIMENSION: 2.9 CM
ECHO RV INTERNAL DIMENSION: 2.8 CM
ECHO RV LONGITUDINAL DIMENSION: 4.8 CM
ECHO RV MID DIMENSION: 2.4 CM
ECHO RV TAPSE: 1.8 CM (ref 1.7–?)
ECHO TV REGURGITANT MAX VELOCITY: 1.66 M/S
ECHO TV REGURGITANT PEAK GRADIENT: 11 MMHG

## 2025-08-27 PROCEDURE — C8929 TTE W OR WO FOL WCON,DOPPLER: HCPCS

## 2025-08-27 PROCEDURE — 93306 TTE W/DOPPLER COMPLETE: CPT | Performed by: INTERNAL MEDICINE

## 2025-08-27 PROCEDURE — 6360000004 HC RX CONTRAST MEDICATION: Performed by: INTERNAL MEDICINE

## 2025-08-27 PROCEDURE — 75571 CT HRT W/O DYE W/CA TEST: CPT

## 2025-08-27 RX ADMIN — SULFUR HEXAFLUORIDE 2 ML: 60.7; .19; .19 INJECTION, POWDER, LYOPHILIZED, FOR SUSPENSION INTRAVENOUS; INTRAVESICAL at 09:06

## 2025-09-04 ENCOUNTER — OFFICE VISIT (OUTPATIENT)
Dept: CARDIOLOGY CLINIC | Age: 75
End: 2025-09-04
Payer: MEDICARE

## 2025-09-04 VITALS
HEIGHT: 66 IN | SYSTOLIC BLOOD PRESSURE: 130 MMHG | WEIGHT: 202 LBS | BODY MASS INDEX: 32.47 KG/M2 | HEART RATE: 63 BPM | DIASTOLIC BLOOD PRESSURE: 78 MMHG | OXYGEN SATURATION: 97 %

## 2025-09-04 DIAGNOSIS — I25.10 CORONARY ARTERY DISEASE INVOLVING NATIVE CORONARY ARTERY OF NATIVE HEART WITHOUT ANGINA PECTORIS: ICD-10-CM

## 2025-09-04 DIAGNOSIS — R06.02 SHORTNESS OF BREATH: ICD-10-CM

## 2025-09-04 DIAGNOSIS — R01.1 HEART MURMUR: Primary | ICD-10-CM

## 2025-09-04 DIAGNOSIS — I10 ESSENTIAL HYPERTENSION: ICD-10-CM

## 2025-09-04 DIAGNOSIS — Z82.49 FAMILY HISTORY OF CORONARY ARTERY DISEASE: ICD-10-CM

## 2025-09-04 DIAGNOSIS — R07.9 CHEST PAIN, UNSPECIFIED TYPE: ICD-10-CM

## 2025-09-04 PROCEDURE — 3017F COLORECTAL CA SCREEN DOC REV: CPT | Performed by: INTERNAL MEDICINE

## 2025-09-04 PROCEDURE — G8427 DOCREV CUR MEDS BY ELIG CLIN: HCPCS | Performed by: INTERNAL MEDICINE

## 2025-09-04 PROCEDURE — 1090F PRES/ABSN URINE INCON ASSESS: CPT | Performed by: INTERNAL MEDICINE

## 2025-09-04 PROCEDURE — 99214 OFFICE O/P EST MOD 30 MIN: CPT | Performed by: INTERNAL MEDICINE

## 2025-09-04 PROCEDURE — 1123F ACP DISCUSS/DSCN MKR DOCD: CPT | Performed by: INTERNAL MEDICINE

## 2025-09-04 PROCEDURE — 1159F MED LIST DOCD IN RCRD: CPT | Performed by: INTERNAL MEDICINE

## 2025-09-04 PROCEDURE — 1036F TOBACCO NON-USER: CPT | Performed by: INTERNAL MEDICINE

## 2025-09-04 PROCEDURE — G8417 CALC BMI ABV UP PARAM F/U: HCPCS | Performed by: INTERNAL MEDICINE

## 2025-09-04 PROCEDURE — 3078F DIAST BP <80 MM HG: CPT | Performed by: INTERNAL MEDICINE

## 2025-09-04 PROCEDURE — 3075F SYST BP GE 130 - 139MM HG: CPT | Performed by: INTERNAL MEDICINE

## 2025-09-04 PROCEDURE — G8400 PT W/DXA NO RESULTS DOC: HCPCS | Performed by: INTERNAL MEDICINE

## 2025-09-04 ASSESSMENT — ENCOUNTER SYMPTOMS
GASTROINTESTINAL NEGATIVE: 1
VOMITING: 0
NAUSEA: 0
SHORTNESS OF BREATH: 0
RESPIRATORY NEGATIVE: 1
EYES NEGATIVE: 1
DIARRHEA: 0